# Patient Record
Sex: MALE | Race: WHITE | NOT HISPANIC OR LATINO | URBAN - METROPOLITAN AREA
[De-identification: names, ages, dates, MRNs, and addresses within clinical notes are randomized per-mention and may not be internally consistent; named-entity substitution may affect disease eponyms.]

---
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
## 2023-09-28 NOTE — ED PEDIATRIC NURSE NOTE - NS ED PATIENT SAFETY CONCERN
bilateral upper extremity Active ROM was WFL (within functional limits)/bilateral  lower extremity Active ROM was WFL (within functional limits) No

## 2025-03-25 NOTE — ED ADULT TRIAGE NOTE - TEMPERATURE IN CELSIUS (DEGREES C)
FUTURE VISIT INFORMATION      FUTURE VISIT INFORMATION:  Date: 4/2/2025  Time: 7:15 AM  Location: [facility]  REFERRAL INFORMATION:  Referring provider: Dr. Niko De Oliveira  Referring providers clinic: [facility]  Reason for visit/diagnosis: Posterior vitreous detachment of both eyes, dry eye syndrome of both eyes    RECORDS REQUESTED FROM:       Clinic name Comments Records Status Imaging Status   [facility] [place]  Fax: [phone]  Phone: [phone] 3/20/25, 2/20/25, 2/15/25 - EYE OV with Dr. De Oliveira 3/25 Sent to Scan    [facility] 3/7/25, 2/11/25 - PCC OV with Dr. Samaniego  2/17/25 - ED OV with Dr. Giang Care Everywhere    [facility] - Imaging  Fax: [phone] 2/18/25 - CTA Head  2/18/25 - CT Head/Brain  10/17/16 - CTA Head  8/31/15 - CTA Head Care Everywhere In PACs   [facility] - Imaging  Fax: [phone] 11/17/24 - CT Head  9/16/20 - MRA Head  6/4/18 - MRI Brain  6/4/18 - CT Head  8/7/17 - MRA Head Care Everywhere In PACs           * 3/25/25 9:06 AM Faxed urgent request to [facility] for images to be pushed to [facility]. - Rosalinda  * 3/25/25 9:10 AM Records received from [facility] and sent to [facility] to be scanned into the chart. - Rosalinda  * 3/27/25 7:11 AM Images received from [facility] and [facility] and attached to the patient in PACs. - Rosalinda   37.3

## 2025-03-31 NOTE — ED PROVIDER NOTE - MDM PATIENT STATEMENT FOR ADDL TREATMENT
Patient has a boil that developed on his right outer thigh and would like to have it lanced.  Would this affect surgery for tomorrow.   Patient with one or more new problems requiring additional work-up/treatment.